# Patient Record
Sex: FEMALE | Race: WHITE | URBAN - METROPOLITAN AREA
[De-identification: names, ages, dates, MRNs, and addresses within clinical notes are randomized per-mention and may not be internally consistent; named-entity substitution may affect disease eponyms.]

---

## 2017-01-31 ENCOUNTER — OFFICE VISIT (OUTPATIENT)
Dept: MEDICAL GROUP | Facility: MEDICAL CENTER | Age: 72
End: 2017-01-31
Payer: MEDICARE

## 2017-01-31 VITALS
OXYGEN SATURATION: 97 % | WEIGHT: 174 LBS | TEMPERATURE: 97.2 F | BODY MASS INDEX: 29.71 KG/M2 | RESPIRATION RATE: 16 BRPM | SYSTOLIC BLOOD PRESSURE: 120 MMHG | DIASTOLIC BLOOD PRESSURE: 80 MMHG | HEIGHT: 64 IN | HEART RATE: 87 BPM

## 2017-01-31 DIAGNOSIS — R53.83 OTHER FATIGUE: ICD-10-CM

## 2017-01-31 DIAGNOSIS — Z12.39 SCREENING FOR BREAST CANCER: ICD-10-CM

## 2017-01-31 DIAGNOSIS — Z00.00 MEDICARE ANNUAL WELLNESS VISIT, SUBSEQUENT: ICD-10-CM

## 2017-01-31 DIAGNOSIS — Z13.6 SCREENING FOR CARDIOVASCULAR CONDITION: ICD-10-CM

## 2017-01-31 DIAGNOSIS — Z12.31 ENCOUNTER FOR SCREENING MAMMOGRAM FOR MALIGNANT NEOPLASM OF BREAST: ICD-10-CM

## 2017-01-31 PROCEDURE — 1036F TOBACCO NON-USER: CPT | Performed by: PHYSICIAN ASSISTANT

## 2017-01-31 PROCEDURE — G0439 PPPS, SUBSEQ VISIT: HCPCS | Performed by: PHYSICIAN ASSISTANT

## 2017-01-31 PROCEDURE — G8432 DEP SCR NOT DOC, RNG: HCPCS | Performed by: PHYSICIAN ASSISTANT

## 2017-01-31 NOTE — ASSESSMENT & PLAN NOTE
Patient presents to clinic today for annual physical examination. Patient states no new issues of concern. Review of medical records shows labs out of date, and will be ordered accordingly today

## 2017-01-31 NOTE — PROGRESS NOTES
Chief Complaint   Patient presents with   • Annual Exam         HPI:  Libia is a 71 y.o. here for Medicare Annual Wellness Visit have annual exam    Medicare annual wellness visit, subsequent  Patient presents to clinic today for annual physical examination. Patient states no new issues of concern. Review of medical records shows labs out of date, and will be ordered accordingly today    Patient Active Problem List    Diagnosis Date Noted   • Medicare annual wellness visit, subsequent 01/31/2017     Current medicines including changes today:   No current outpatient prescriptions on file.     No current facility-administered medications for this visit.       The patient reports adherence to this regimen   Current supplements as per medication list.   Chronic narcotic pain medicines: no     Allergies: Review of patient's allergies indicates no known allergies.    Current social contact/activities: yes     Exercise: yes    She  reports that she has never smoked. She has never used smokeless tobacco. She reports that she does not drink alcohol or use illicit drugs.  Counseling given: Not Answered    DPA/Advanced directive: Yes (Probably... 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     Screening:  Depression Screening    Little interest or pleasure in doing things?     Feeling down, depressed , or hopeless?    Trouble falling or staying asleep, or sleeping too much?     Feeling tired or having little energy?     Poor appetite or overeating?     Feeling bad about yourself - or that you are a failure or have let yourself or your family down?    Trouble concentrating on things, such as reading the newspaper or watching television?    Moving or speaking so slowly that other people could have noticed.  Or the opposite - being so fidgety or restless that you have  been moving around a lot more than usual?     Thoughts that you would be better off dead, or of hurting yourself?     Patient Health Questionnaire Score:      If depressive symptoms identified deferred to follow up visit unless specifically addressed in assesment and plan.      Screening for Cognitive Impairment    Three Minute Recall (banana, sunrise, fence)   /3    Draw clock face with all 12 numbers set to the hand to show 10 minures past 11 o'clock       Cognitive concerns identified defferred for follow up unless specifically addressed in assesment and plan.    Fall Risk Assessment    Has the patient had two or more falls in the last year or any fall with injury in the last year?  No    Safety Assessment    Throw rugs on floor.     Handrails on all stairs.     Good lighting in all hallways.     Difficulty hearing.     Patient counseled about all safety risks that were identified.    Functional Assessment ADLs    Are there any barriers preventing you from cooking for yourself or meeting nutritional needs?   .    Are there any barriers preventing you from driving safely or obtaining transportation?   .    Are there any barriers preventing you from using a telephone or calling for help?   .    Are there any barriers preventing you from shopping?   .    Are there any barriers preventing you from taking care of your own finances?   .    Are there any barriers preventing you from managing your medications?   .    Are currently engaing any exercise or physical activity?   .       Health Maintenance Summary                Annual Wellness Visit Overdue 1945     IMM DTaP/Tdap/Td Vaccine Overdue 4/3/1966     PAP SMEAR Overdue 4/3/1968     COLONOSCOPY Overdue 4/3/1997     IMM ZOSTER VACCINE Overdue 4/3/2007     BONE DENSITY Overdue 4/3/2012     IMM PNEUMOCOCCAL 65+ (ADULT) LOW/MEDIUM RISK SERIES Overdue 4/3/2012     MAMMOGRAM Overdue 2/19/2015      Done 2/19/2014 [id number] (DIAGNOSTIC)    IMM INFLUENZA Overdue  "9/1/2016           Patient Care Team:  Roverto Rainey PA-C as PCP - General (Family Medicine)      Social History   Substance Use Topics   • Smoking status: Never Smoker    • Smokeless tobacco: Never Used   • Alcohol Use: No     No family history on file.  She  has a past medical history of Rib fracture and Pneumothorax.   Past Surgical History   Procedure Laterality Date   • Hysterectomy, total abdominal     • Other       right toe   • Other orthopedic surgery Left      clavicle   • Appendectomy             Exam:     Blood pressure 120/80, pulse 87, temperature 36.2 °C (97.2 °F), resp. rate 16, height 1.626 m (5' 4.02\"), weight 78.926 kg (174 lb), SpO2 97 %, not currently breastfeeding. Body mass index is 29.85 kg/(m^2).    Hearing excellent.    Dentition good  Alert, oriented in no acute distress.  Eye contact is good, speech goal directed, affect calm      Assessment and Plan. The following treatment and monitoring plan is recommended:      1. Medicare annual wellness visit, subsequent  - CBC WITHOUT DIFFERENTIAL; Future  - COMP METABOLIC PANEL; Future  - URINALYSIS,CULTURE IF INDICATED; Future    2. Screening for breast cancer  - MA-SCREEN MAMMO W/CAD-BILAT    3. Screening for cardiovascular condition  - LIPID PROFILE; Future    4. Other fatigue  - TSH; Future    5. Encounter for screening mammogram for malignant neoplasm of breast   - MA-SCREEN MAMMO W/CAD-BILAT        Services needed: None  Health Care Screening recommendations as per orders if indicated.  Referrals offered: PT/OT/Nutrition counseling/Behavioral Health/Smoking cessation as per orders if indicated.    Discussion today about general wellness and lifestyle habits:    · Prevent falls and reduce trip hazards; Cautioned about securing or removing rugs.  · Have a working fire alarm and carbon monoxide detector;   · Engage in regular physical activity and social activities       Follow-up: No Follow-up on file.      "